# Patient Record
Sex: MALE | URBAN - METROPOLITAN AREA
[De-identification: names, ages, dates, MRNs, and addresses within clinical notes are randomized per-mention and may not be internally consistent; named-entity substitution may affect disease eponyms.]

---

## 2017-02-01 ENCOUNTER — OFFICE VISIT (OUTPATIENT)
Dept: FAMILY MEDICINE | Facility: CLINIC | Age: 27
End: 2017-02-01

## 2017-02-01 VITALS
OXYGEN SATURATION: 97 % | DIASTOLIC BLOOD PRESSURE: 84 MMHG | BODY MASS INDEX: 44.91 KG/M2 | HEART RATE: 94 BPM | HEIGHT: 69 IN | WEIGHT: 303.2 LBS | TEMPERATURE: 98.3 F | SYSTOLIC BLOOD PRESSURE: 136 MMHG

## 2017-02-01 DIAGNOSIS — F41.9 ANXIETY: Primary | ICD-10-CM

## 2017-02-01 RX ORDER — HYDROXYZINE HYDROCHLORIDE 25 MG/1
25-50 TABLET, FILM COATED ORAL
Qty: 30 TABLET | Refills: 1 | Status: SHIPPED | OUTPATIENT
Start: 2017-02-01 | End: 2017-02-17

## 2017-02-01 NOTE — PROGRESS NOTES
"      HPI:       Michael Elise is a 26 year old who presents for the following  Patient presents with:  RECHECK: follow up anxiety and chest discomfort from last visit    Michael is a 26 year old male who recently established care at our clinic who I previously evaluated for anxiety/lightheadedness that he was experiencing in December and had been to the hospital multiple times for evaluation (normal workup EKG, CXR, CTA ruled out PE). He was given hydroxyzine for me as a PRN as his patterns of anxiety episodes were happening more in the evening time.   The medication has helped him slightly and he would take 1-2 pills. His episodes have been less frequent, 25% less. He reports 3-4 episodes in the week, usually lays down when it happens and go to sleep and this takes care of them.     He did have one episode where he was at work for 15 minutes and he had onset of anxiety (chest discomfort, chest tightness, shortness of breath) - says episodes last upwards from 30 minutes to an hour. He is not aware of any triggers.     He does drink caffeinated beverages (sarai salazar, dr. Hunt, pure life tea) on the occasion but not frequently.     FH: no family members with anxiety (sister)     SH: dating, works, occasional alcohol , quit smoking (1/3 pack for 2 years).     Problem, Medication and Allergy Lists were reviewed and are current.  Patient is an established patient of this clinic.         Review of Systems:   Review of SystemsAs above per HPI          Physical Exam:     Patient Vitals for the past 24 hrs:   BP Temp Temp src Pulse SpO2 Height Weight   02/01/17 1137 136/84 mmHg 98.3  F (36.8  C) Oral 94 97 % 5' 9\" (175.3 cm) (!) 303 lb 3.2 oz (137.531 kg)     Body mass index is 44.75 kg/(m^2).  Vitals were reviewed and were normal     Physical Exam  GEN: good eye contact, well-nourished, NAD  CV: hemodynamically stable  ABD: obese  PSYCH : normal rate of speech, euthymic, non-depressed, linear thoughts  GAIT: normal    " Results:   None  Assessment and Plan     1. Anxiety  We'll continue to try atarax PRN for his events as this has been helpful. If this gets worse we will move forward with psychiatry/psychology referral.  - hydrOXYzine (ATARAX) 25 MG tablet; Take 1-2 tablets (25-50 mg) by mouth every evening as needed for anxiety  Dispense: 30 tablet; Refill: 1  There are no discontinued medications.  Options for treatment and follow-up care were reviewed with the patient. Michael Elise  engaged in the decision making process and verbalized understanding of the options discussed and agreed with the final plan.    Shola Mercado MD PGY2  [facility]  [phone]

## 2017-02-01 NOTE — PATIENT INSTRUCTIONS
I have sent your atarax/hydroxyzine to the [facility]    Follow-up appointment with me in 1-2 months or as needed

## 2017-02-01 NOTE — MR AVS SNAPSHOT
After Visit Summary   2017    Michael Elise    MRN: [id number]           Patient Information     Date Of Birth          1990        Visit Information        Provider Department      2017 11:20 AM Shola Mercado MD [facility]        Today's Diagnoses     Anxiety    -  1       Care Instructions    I have sent your atarax/hydroxyzine to the [facility]    Follow-up appointment with me in 1-2 months or as needed        Follow-ups after your visit        Who to contact     Please call your clinic at [phone] to:    Ask questions about your health    Make or cancel appointments    Discuss your medicines    Learn about your test results    Speak to your doctor   If you have compliments or concerns about an experience at your clinic, or if you wish to file a complaint, please contact [facility] Patient Relations at [phone] or email us at [email or web address]         Additional Information About Your Visit        MyChart Information     Epitirot is an electronic gateway that provides easy, online access to your medical records. With WeAre.Us, you can request a clinic appointment, read your test results, renew a prescription or communicate with your care team.     To sign up for Epitirot visit the website at [email or web address]   You will be asked to enter the access code listed below, as well as some personal information. Please follow the directions to create your username and password.     Your access code is: [email or web address]  Expires: 3/12/2017  4:47 PM     Your access code will  in 90 days. If you need help or a new code, please contact your [facility] or call [phone] for assistance.        Care EveryWhere ID     This is your Care EveryWhere ID. This could be used by other organizations to access your [place] medical records  [id number]        Your Vitals Were     Pulse Temperature Height  "BMI (Body Mass Index) Pulse Oximetry       94 98.3  F (36.8  C) (Oral) 5' 9\" (175.3 cm) 44.75 kg/m2 97%        Blood Pressure from Last 3 Encounters:   02/01/17 136/84   12/12/16 141/80    Weight from Last 3 Encounters:   02/01/17 303 lb 3.2 oz (137.531 kg)   12/12/16 306 lb 6.4 oz (138.982 kg)              Today, you had the following     No orders found for display         Today's Medication Changes          These changes are accurate as of: 2/1/17 11:54 AM.  If you have any questions, ask your nurse or doctor.               These medicines have changed or have updated prescriptions.        Dose/Directions    hydrOXYzine 25 MG tablet   Commonly known as:  ATARAX   This may have changed:  how much to take   Used for:  Anxiety   Changed by:  Shola Mercado MD        Dose:  25-50 mg   Take 1-2 tablets (25-50 mg) by mouth every evening as needed for anxiety   Quantity:  30 tablet   Refills:  1            Where to get your medicines      These medications were sent to [id number] [place] - [address]  AT [facility] [address] [place] [address] [facility] [place] [address]     Phone:  [phone]    - hydrOXYzine 25 MG tablet             Primary Care Provider Office Phone # Fax #    Shola Mercado [phone]       [address] [address]        Thank you!     Thank you for choosing [facility]  for your care. Our goal is always to provide you with excellent care. Hearing back from our patients is one way we can continue to improve our services. Please take a few minutes to complete the written survey that you may receive in the mail after your visit with us. Thank you!             Your Updated Medication List - Protect others around you: Learn how to safely use, store and throw away your medicines at www.disposemymeds.org.          This list is accurate as of: 2/1/17 11:54 AM.  Always use your most recent med list.       "             Brand Name Dispense Instructions for use    albuterol 108 (90 BASE) MCG/ACT Inhaler    PROAIR HFA/PROVENTIL HFA/VENTOLIN HFA     Inhale 2 puffs into the lungs every 6 hours       hydrOXYzine 25 MG tablet    ATARAX    30 tablet    Take 1-2 tablets (25-50 mg) by mouth every evening as needed for anxiety       PREDNISONE PO      Take 10 mg by mouth Take 2 tablets by mouth x 1 week.

## 2017-02-01 NOTE — PROGRESS NOTES
Preceptor attestation:  Patient seen and discussed with the resident.  Assessment and plan reviewed with resident and agreed upon.  Supervising physician: Bel Meneses  [facility]

## 2017-02-17 ENCOUNTER — OFFICE VISIT (OUTPATIENT)
Dept: FAMILY MEDICINE | Facility: CLINIC | Age: 27
End: 2017-02-17

## 2017-02-17 ENCOUNTER — TELEPHONE (OUTPATIENT)
Dept: FAMILY MEDICINE | Facility: CLINIC | Age: 27
End: 2017-02-17

## 2017-02-17 VITALS
WEIGHT: 307.8 LBS | BODY MASS INDEX: 45.45 KG/M2 | TEMPERATURE: 98.1 F | DIASTOLIC BLOOD PRESSURE: 79 MMHG | HEART RATE: 81 BPM | SYSTOLIC BLOOD PRESSURE: 138 MMHG

## 2017-02-17 DIAGNOSIS — F41.9 ANXIETY: ICD-10-CM

## 2017-02-17 RX ORDER — ESCITALOPRAM OXALATE 10 MG/1
10 TABLET ORAL DAILY
Qty: 30 TABLET | Refills: 1 | Status: SHIPPED | OUTPATIENT
Start: 2017-02-17 | End: 2017-04-10

## 2017-02-17 RX ORDER — HYDROXYZINE HYDROCHLORIDE 25 MG/1
25-50 TABLET, FILM COATED ORAL EVERY 8 HOURS PRN
Qty: 30 TABLET | Refills: 3 | Status: SHIPPED | OUTPATIENT
Start: 2017-02-17

## 2017-02-17 ASSESSMENT — ANXIETY QUESTIONNAIRES
7. FEELING AFRAID AS IF SOMETHING AWFUL MIGHT HAPPEN: NEARLY EVERY DAY
IF YOU CHECKED OFF ANY PROBLEMS ON THIS QUESTIONNAIRE, HOW DIFFICULT HAVE THESE PROBLEMS MADE IT FOR YOU TO DO YOUR WORK, TAKE CARE OF THINGS AT HOME, OR GET ALONG WITH OTHER PEOPLE: VERY DIFFICULT
5. BEING SO RESTLESS THAT IT IS HARD TO SIT STILL: NOT AT ALL
3. WORRYING TOO MUCH ABOUT DIFFERENT THINGS: SEVERAL DAYS
1. FEELING NERVOUS, ANXIOUS, OR ON EDGE: MORE THAN HALF THE DAYS
6. BECOMING EASILY ANNOYED OR IRRITABLE: NEARLY EVERY DAY
4. TROUBLE RELAXING: NEARLY EVERY DAY
2. NOT BEING ABLE TO STOP OR CONTROL WORRYING: NEARLY EVERY DAY
GAD7 TOTAL SCORE: 15

## 2017-02-17 NOTE — MR AVS SNAPSHOT
After Visit Summary   2017    Michael Elise    MRN: [id number]           Patient Information     Date Of Birth          1990        Visit Information        Provider Department      2017 10:20 AM Shola Mercado MD [facility]        Today's Diagnoses     Anxiety          Care Instructions    Please set up an appointment to see me in 2-3 weeks    Lexapro: Take half tablet (5mg) for the first 7 days then take 10mg daily.     You should receive a phone call about setting up an appointment with a Psychologist.             Follow-ups after your visit        Who to contact     Please call your clinic at [phone] to:    Ask questions about your health    Make or cancel appointments    Discuss your medicines    Learn about your test results    Speak to your doctor   If you have compliments or concerns about an experience at your clinic, or if you wish to file a complaint, please contact [facility] Patient Relations at [phone] or email us at [email or web address]         Additional Information About Your Visit        MyChart Information     Q-Bot is an electronic gateway that provides easy, online access to your medical records. With Q-Bot, you can request a clinic appointment, read your test results, renew a prescription or communicate with your care team.     To sign up for Q-Bot visit the website at [email or web address]   You will be asked to enter the access code listed below, as well as some personal information. Please follow the directions to create your username and password.     Your access code is: [email or web address]  Expires: 3/12/2017  4:47 PM     Your access code will  in 90 days. If you need help or a new code, please contact your [facility] or call [phone] for assistance.        Care EveryWhere ID     This is your Care EveryWhere ID. This could be used by other organizations  to access your [place] medical records  [id number]        Your Vitals Were     Pulse Temperature BMI (Body Mass Index)             81 98.1  F (36.7  C) (Oral) 45.45 kg/m2          Blood Pressure from Last 3 Encounters:   02/17/17 138/79   02/01/17 136/84   12/12/16 141/80    Weight from Last 3 Encounters:   02/17/17 (!) 307 lb 12.8 oz (139.6 kg)   02/01/17 (!) 303 lb 3.2 oz (137.5 kg)   12/12/16 (!) 306 lb 6.4 oz (139 kg)              Today, you had the following     No orders found for display       Primary Care Provider Office Phone # Fax #    Shola Mercado [phone]       [address] [address]        Thank you!     Thank you for choosing [facility]  for your care. Our goal is always to provide you with excellent care. Hearing back from our patients is one way we can continue to improve our services. Please take a few minutes to complete the written survey that you may receive in the mail after your visit with us. Thank you!             Your Updated Medication List - Protect others around you: Learn how to safely use, store and throw away your medicines at www.disposemymeds.org.          This list is accurate as of: 2/17/17 11:25 AM.  Always use your most recent med list.                   Brand Name Dispense Instructions for use    albuterol 108 (90 BASE) MCG/ACT Inhaler    PROAIR HFA/PROVENTIL HFA/VENTOLIN HFA     Inhale 2 puffs into the lungs every 6 hours       hydrOXYzine 25 MG tablet    ATARAX    30 tablet    Take 1-2 tablets (25-50 mg) by mouth every evening as needed for anxiety       PREDNISONE PO      Take 10 mg by mouth Take 2 tablets by mouth x 1 week.

## 2017-02-17 NOTE — PATIENT INSTRUCTIONS
Please set up an appointment to see me in 2-3 weeks    Lexapro: Take half tablet (5mg) for the first 7 days then take 10mg daily.     You should receive a phone call about setting up an appointment with a Psychologist.     MENTAL HEALTH REFERRAL  Message routed to Team [place] to evaluate for in house therapy.  Sayda Allred  2/21/17    MENTAL HEALTH REFERRAL:  Patient is scheduled for:  Date:  Thursday March 2, 2017  Time:  8:30 AM with Dr. Lombardi for 1 hour.  Sayda Allred  2/27/17

## 2017-02-17 NOTE — PROGRESS NOTES
Preceptor attestation:  Patient seen and discussed with the resident. Assessment and plan reviewed with resident and agreed upon.  Supervising physician: Tino Gannon  [facility]

## 2017-02-17 NOTE — PROGRESS NOTES
HPI:       Michael Elise is a 26 year old who presents for the following  Patient presents with:  Anxiety: Pt states last night his anxiety got really bad and his next appointment wasn't for a couple of weeks and decided he could not wait to come in until then.     Patient called answering service yesterday evening with concerns for what he perceives as anxiety along with anxiety attacks. He reports the chest pressure during these episodes as mentioned in our previous medical visits. No association with sweats or rapid heart beating. Still uncertain about a pattern or any obvious triggers.   He states the frequency of these events are happening more frequently and have occurrred while at work (previously more towards the early evening). Endorses worrying, difficulty with relaxing, irritability, fear of next anxiety event occurring.       SEAN-7 score today: 15  Bipolar screening : negative.   Denies: any periods of grandiose thoughts, decreased need for sleep due to excessive energy, racing thoughts, risky behavior including reckless spending of money, or increased sexual interest.    Denies suicidal ideation    FH: reports mom and sister are diagnosed with bipolar, no family members with cardiac problems or endocrine abnormalities to his knowledge.     Problem, Medication and Allergy Lists were reviewed and are current.  Patient is an established patient of this clinic.         Review of Systems:   Review of SystemsAs above per HPI          Physical Exam:   Patient Vitals for the past 24 hrs:   BP Temp Temp src Pulse Weight   02/17/17 1034 138/79 98.1  F (36.7  C) Oral 81 (!) 307 lb 12.8 oz (139.6 kg)     Body mass index is 45.45 kg/(m^2).  Vitals were reviewed and were normal     Physical Exam  PSYCH: good eye contact, no delusions or hallucinations, appropriate behavior, euthymic, normal rate of speech  GEN: non-diaphoretic, calm, AAox3  CV: hemodynamically stable  Gait: Normal      Results:   No new labs  or imaging  Assessment and Plan     1. Anxiety  Formally, we cannot diagnose him with SEAN until symptoms have been present for at least 6 months, his first symptoms started approximately 2.5 months ago. We discussed that risks/benefits of Lexapro and the goals of treatment (reduce days of anxiety, reduce dependence on sleep aids). He was cautioned about the side effects of lexapro in including potential risk of increased risk for suicidal ideation, headache, GI symptoms, insomnia. Bipolar screening was negative, although there is a family history in mom and sister of bipolar disorder so we will monitor him closely.   He is desiring a referral to a Psychologist so we will set him up with a provider.     We will start with 7 days of Lexapro at 5mg, followed by 10mg daily. Follow-up in 2-3 weeks.   - hydrOXYzine (ATARAX) 25 MG tablet; Take 1-2 tablets (25-50 mg) by mouth every 8 hours as needed for anxiety No more than 3-4 tablets a day.  Dispense: 30 tablet; Refill: 3  - escitalopram (LEXAPRO) 10 MG tablet; Take 1 tablet (10 mg) by mouth daily  Dispense: 30 tablet; Refill: 1  - MENTAL HEALTH REFERRAL; Future  Medications Discontinued During This Encounter   Medication Reason     hydrOXYzine (ATARAX) 25 MG tablet Reorder     PREDNISONE PO Therapy completed     Options for treatment and follow-up care were reviewed with the patient. Michael Elise  engaged in the decision making process and verbalized understanding of the options discussed and agreed with the final plan.    Shola Mercado MD PGY2  [facility]  [phone]

## 2017-02-17 NOTE — TELEPHONE ENCOUNTER
"Patient called into answering service this evening and he was called back.    He reports that he has been going to [facility] for a couple months and had anxiety attacks in the past prior to going to our clinic.  He has had a workup in the past \"including CT scans\" and was told that this is anxiety.  He notes that he has been having feelings of \"a ton of bricks on his chest and has felt short of breath.  He has not been doing activities today.  This pain he is having is typical of previous pain episodes where he has been worked up and told this is anxiety.   He has tried an inhaler, which did not help and also tried some prednisone which did not help.  He notes that he was given these medications at another clinic and does not know why he took these things.     He has not been doing anything else today and notes that the pain feels like a tight rope wrapped around the torso.  He has hydroxyzine and has not taken it because it puts him to sleep. He was not aware that this was for anxiety.  He notes that he will try the hydroxyzine but he \"needs something to help\" because this anxiety has become overwhelming and is making him depressed.  Notes that he is not suicidal or having thoughts of harming others but would be interested in talking to Dr. Mercado about medications to help prevent this or lessen the attacks.      Encouraged to call tomorrow morning and get into see Dr Mercado, (consider prescribing SSRI or therapy for severe anxiety and further diagnostic workup).  Told that if he becomes suicidal to go to the ER.    Told that if his pain changes and becomes different for him or he is more short of breath to go to the ER for further eval to rule out chest pain as an origin.  He expressed understanding.     Sylwia Loomis MD  "

## 2017-02-18 ASSESSMENT — ANXIETY QUESTIONNAIRES: GAD7 TOTAL SCORE: 15

## 2017-02-21 ENCOUNTER — DOCUMENTATION ONLY (OUTPATIENT)
Dept: FAMILY MEDICINE | Facility: CLINIC | Age: 27
End: 2017-02-21

## 2017-02-21 NOTE — PROGRESS NOTES
Hello Team [place]  Please review chart notes to evaluate for in house therapy.  Thank you.  Sayda Allred  2/21/17

## 2017-02-23 NOTE — PROGRESS NOTES
Recent review of the schedule indicates that Dr. Lombardi and Dr. Lennon both have three or more open slots in the next few weeks.  Will ask them to comment on whether or not either could  this patient.  If not, please share the following community resources for psychotherapy.    [facility]  [address] [place] [address] [address]  [place] [address]  [phone]    [facility]  [address] [place] [address]  [address]  [place], [place] [address]  [phone]    [facility] - [address]   [facility] [address]  [phone]

## 2017-02-23 NOTE — PROGRESS NOTES
I am waiting on a few new referrals, so am closed for now.     Carolynn Lennon, Ph.D.,   Behavioral Health Fellow

## 2017-02-24 NOTE — PROGRESS NOTES
I have availability to  this patient. Please reach out to the patient to get them scheduled with me.    Nathaniel Lombardi, Ph.D.  Behavioral Health Fellow

## 2017-03-02 ENCOUNTER — TELEPHONE (OUTPATIENT)
Dept: FAMILY MEDICINE | Facility: CLINIC | Age: 27
End: 2017-03-02

## 2017-03-02 NOTE — TELEPHONE ENCOUNTER
This provider placed an outreach call to the patient as he did not show for his scheduled appointment today 3/2/2017. Left a message requesting the patient to call the clinic and re-schedule if he is interested. One additional outreach call will be placed by this provider, after which he will be mailed a letter.    Nathaniel Lombardi, PhD   Behavioral Health Fellow

## 2017-03-16 ENCOUNTER — TELEPHONE (OUTPATIENT)
Dept: FAMILY MEDICINE | Facility: CLINIC | Age: 27
End: 2017-03-16

## 2017-03-16 NOTE — TELEPHONE ENCOUNTER
This provider placed a second outreach call to the patient as he did not show for his scheduled appointment on 3/2/2017. Left a message requesting the patient to call the clinic and re-schedule if he is interested. No additional outreach calls to be placed by this provider, and the patient to be mailed a letter pending callback.     Nathaniel Lombardi, PhD   Behavioral Health Fellow

## 2017-04-10 DIAGNOSIS — F41.9 ANXIETY: ICD-10-CM

## 2017-04-10 RX ORDER — ESCITALOPRAM OXALATE 10 MG/1
10 TABLET ORAL DAILY
Qty: 30 TABLET | Refills: 1 | Status: SHIPPED | OUTPATIENT
Start: 2017-04-10 | End: 2018-03-21

## 2018-03-21 ENCOUNTER — OFFICE VISIT (OUTPATIENT)
Dept: FAMILY MEDICINE | Facility: CLINIC | Age: 28
End: 2018-03-21
Payer: COMMERCIAL

## 2018-03-21 VITALS
DIASTOLIC BLOOD PRESSURE: 77 MMHG | RESPIRATION RATE: 16 BRPM | OXYGEN SATURATION: 96 % | TEMPERATURE: 97.7 F | SYSTOLIC BLOOD PRESSURE: 130 MMHG | HEART RATE: 87 BPM

## 2018-03-21 DIAGNOSIS — F41.1 GAD (GENERALIZED ANXIETY DISORDER): Primary | ICD-10-CM

## 2018-03-21 RX ORDER — ALBUTEROL SULFATE 90 UG/1
2 AEROSOL, METERED RESPIRATORY (INHALATION) EVERY 6 HOURS
Status: CANCELLED | OUTPATIENT
Start: 2018-03-21

## 2018-03-21 ASSESSMENT — ANXIETY QUESTIONNAIRES
6. BECOMING EASILY ANNOYED OR IRRITABLE: MORE THAN HALF THE DAYS
5. BEING SO RESTLESS THAT IT IS HARD TO SIT STILL: NOT AT ALL
3. WORRYING TOO MUCH ABOUT DIFFERENT THINGS: NEARLY EVERY DAY
2. NOT BEING ABLE TO STOP OR CONTROL WORRYING: SEVERAL DAYS
1. FEELING NERVOUS, ANXIOUS, OR ON EDGE: SEVERAL DAYS
GAD7 TOTAL SCORE: 9
4. TROUBLE RELAXING: MORE THAN HALF THE DAYS
7. FEELING AFRAID AS IF SOMETHING AWFUL MIGHT HAPPEN: NOT AT ALL

## 2018-03-21 NOTE — MR AVS SNAPSHOT
After Visit Summary   3/21/2018    Michael Elise    MRN: [id number]           Patient Information     Date Of Birth          1990        Visit Information        Provider Department      3/21/2018 11:20 AM Shola Mercado MD [facility]        Today's Diagnoses     SEAN (generalized anxiety disorder)    -  1      Care Instructions    For an evaluation for a emotional support animal :    Can call to schedule with a provider, but  recommended leaving a message with provider to confirm that they will do this type of evaluation.  [facility]  [address] [place] [address] [address], [place] [address]  [phone]                Follow-ups after your visit        Who to contact     Please call your clinic at [phone] to:    Ask questions about your health    Make or cancel appointments    Discuss your medicines    Learn about your test results    Speak to your doctor            Additional Information About Your Visit        MyChart Information     TrelliSoftt is an electronic gateway that provides easy, online access to your medical records. With Celly, you can request a clinic appointment, read your test results, renew a prescription or communicate with your care team.     To sign up for TrelliSoftt visit the website at [email or web address]   You will be asked to enter the access code listed below, as well as some personal information. Please follow the directions to create your username and password.     Your access code is: [email or web address]  Expires: 2018 11:51 AM     Your access code will  in 90 days. If you need help or a new code, please contact your [facility] or call [phone] for assistance.        Care EveryWhere ID     This is your Care EveryWhere ID. This could be used by other organizations to access your [place] medical records  [id number]        Your Vitals Were     Pulse Temperature Respirations Pulse Oximetry           87 97.7  F (36.5  C) (Oral) 16 96%         Blood Pressure from Last 3 Encounters:   03/21/18 130/77   02/17/17 138/79   02/01/17 136/84    Weight from Last 3 Encounters:   02/17/17 (!) 307 lb 12.8 oz (139.6 kg)   02/01/17 (!) 303 lb 3.2 oz (137.5 kg)   12/12/16 (!) 306 lb 6.4 oz (139 kg)              Today, you had the following     No orders found for display         Today's Medication Changes          These changes are accurate as of 3/21/18 11:51 AM.  If you have any questions, ask your nurse or doctor.               Stop taking these medicines if you haven't already. Please contact your care team if you have questions.     albuterol 108 (90 BASE) MCG/ACT Inhaler   Commonly known as:  PROAIR HFA/PROVENTIL HFA/VENTOLIN HFA   Stopped by:  Shola Mercado MD           escitalopram 10 MG tablet   Commonly known as:  LEXAPRO   Stopped by:  Shola Mercado MD                    Primary Care Provider Office Phone # Fax #    Shola Mercado [phone]       [place] [address]        Equal Access to Services     SHARI GUTIERREZ AH: Hadcatarina astorgao Sochuchoali, waaxda luqadaha, qaybta kaalmada adeegyada, waxay rosa wilder. So [facility] [phone].    ATENCIÓN: Si habla español, tiene a scanlon disposición servicios gratuitos de asistencia lingüística. Llame al [phone].    We comply with applicable federal civil rights laws and [place] laws. We do not discriminate on the basis of race, color, national origin, age, disability, sex, sexual orientation, or gender identity.            Thank you!     Thank you for choosing [facility]  for your care. Our goal is always to provide you with excellent care. Hearing back from our patients is one way we can continue to improve our services. Please take a few minutes to complete the written survey that you may receive in the mail after your visit with us. Thank you!              Your Updated Medication List - Protect others around you: Learn how to safely use, store and throw away your medicines at www.disposemymeds.org.          This list is accurate as of 3/21/18 11:51 AM.  Always use your most recent med list.                   Brand Name Dispense Instructions for use Diagnosis    hydrOXYzine 25 MG tablet    ATARAX    30 tablet    Take 1-2 tablets (25-50 mg) by mouth every 8 hours as needed for anxiety No more than 3-4 tablets a day.    Anxiety

## 2018-03-21 NOTE — PROGRESS NOTES
SUBJECTIVE       Michael Elise is a 27 year old  male with a PMH significant for:     Patient Active Problem List   Diagnosis     Anxiety     SEAN (generalized anxiety disorder)     Patient presents with:  Anxiety: Pt is here to discuss his anxiety today.   Medication Reconciliation: Needs attention - Pt states the depression and anxiety meds he has been prescribed did not work for him and he stopped taking them about two months ago.     Having anxiety most days. Occasional panic attacks, says his heart rate goes up and will hyperventilate but not frequently. But there is no tunnel vision/feeling of room closing, no diaphoresis. He did have 1 episode of a panic attack in end of 2016 where paramedics were called and he was evaluated in ED. He is requesting a letter today to move into his new living situation as animals are not allowed. He has a 1 year old labrador that he's been raising that he deems as his own emotional care animal. Has been setup with appointments at our clinic for psychotherapy but missed them, states he has been busy and no longer feels the need for them.     PHQ-9 score:    PHQ-9 SCORE 3/21/2018   Total Score 8     SEAN-7 SCORE 2/17/2017 3/21/2018   Total Score 15 9         SH: holding the same job, significant other. 10 cigs/day. Occasional social drinker.       PMH, Medications and Allergies were reviewed and updated as needed.    ROS: As above per HPI        OBJECTIVE     Vitals:    03/21/18 1138   BP: 130/77   BP Location: Left arm   Patient Position: Sitting   Cuff Size: Adult Large   Pulse: 87   Resp: 16   Temp: 97.7  F (36.5  C)   TempSrc: Oral   SpO2: 96%     There is no height or weight on file to calculate BMI.    GEN: NAD, appears stated age, fair hygiene,   CV: hemodynamically stable  NECK: supple, no obvious masses  HEENT: EOMI, head normocephalic, sclera anicteric, trachea midline, gauged ears bilaterally , growing out facial hair, wears corrective lenses  PULM: clear  bilaterally without wheezes/rhonchi/rales, non-labored  ABD: non-tender, obese  NEURO: CN II-XII intact  GAIT: normal  SKIN: no rashes or abnormalities  PSYCH: flat, soft-spoken, linear thoughts, no delusions/hallucinations, comprehensible, some eye contact    LABS/IMAGING/EKG  No results found for this or any previous visit (from the past 24 hour(s)).    ASSESSMENT AND PLAN     SEAN (generalized anxiety disorder)  Referral given to Psychology service which does formal assessment for emotional care animal. Patient not interested in medication therapy at this time. Also not interested in psychotherapy.     RTC as needed      Shola Mercado MD PGY3  [facility]    Discussed with Dr. Johnnie Mac who agrees with the above assessment and plan.

## 2018-03-21 NOTE — PATIENT INSTRUCTIONS
For an evaluation for a emotional support animal :    Can call to schedule with a provider, but  recommended leaving a message with provider to confirm that they will do this type of evaluation.  [facility]  [address], [place] [address] [phone]

## 2018-03-21 NOTE — PROGRESS NOTES
Preceptor attestation:  Patient seen and discussed with the resident. Assessment and plan reviewed with resident and agreed upon.  Supervising physician: Johnnie Mac MD  [facility]

## 2018-03-22 ASSESSMENT — PATIENT HEALTH QUESTIONNAIRE - PHQ9: SUM OF ALL RESPONSES TO PHQ QUESTIONS 1-9: 8

## 2018-03-22 ASSESSMENT — ANXIETY QUESTIONNAIRES: GAD7 TOTAL SCORE: 9

## 2018-04-25 ENCOUNTER — MEDICAL CORRESPONDENCE (OUTPATIENT)
Dept: HEALTH INFORMATION MANAGEMENT | Facility: CLINIC | Age: 28
End: 2018-04-25